# Patient Record
Sex: FEMALE | Race: WHITE | URBAN - METROPOLITAN AREA
[De-identification: names, ages, dates, MRNs, and addresses within clinical notes are randomized per-mention and may not be internally consistent; named-entity substitution may affect disease eponyms.]

---

## 2020-11-07 ENCOUNTER — INPATIENT (INPATIENT)
Facility: HOSPITAL | Age: 80
LOS: 3 days | Discharge: HOME | DRG: 287 | End: 2020-11-11
Attending: FAMILY MEDICINE | Admitting: FAMILY MEDICINE
Payer: MEDICARE

## 2020-11-07 VITALS — WEIGHT: 130.07 LBS

## 2020-11-07 DIAGNOSIS — R94.31 ABNORMAL ELECTROCARDIOGRAM [ECG] [EKG]: ICD-10-CM

## 2020-11-07 DIAGNOSIS — R07.9 CHEST PAIN, UNSPECIFIED: ICD-10-CM

## 2020-11-07 DIAGNOSIS — I21.3 ST ELEVATION (STEMI) MYOCARDIAL INFARCTION OF UNSPECIFIED SITE: ICD-10-CM

## 2020-11-07 LAB
ALBUMIN SERPL ELPH-MCNC: 3.9 G/DL — SIGNIFICANT CHANGE UP (ref 3.3–5)
ALP SERPL-CCNC: 136 U/L — HIGH (ref 40–120)
ALT FLD-CCNC: 60 U/L — SIGNIFICANT CHANGE UP (ref 12–78)
ANION GAP SERPL CALC-SCNC: 7 MMOL/L — SIGNIFICANT CHANGE UP (ref 5–17)
APPEARANCE UR: CLEAR — SIGNIFICANT CHANGE UP
APTT BLD: 31.3 SEC — SIGNIFICANT CHANGE UP (ref 27.5–35.5)
AST SERPL-CCNC: 60 U/L — HIGH (ref 15–37)
BASOPHILS # BLD AUTO: 0 K/UL — SIGNIFICANT CHANGE UP (ref 0–0.2)
BASOPHILS NFR BLD AUTO: 0 % — SIGNIFICANT CHANGE UP (ref 0–2)
BILIRUB SERPL-MCNC: 0.8 MG/DL — SIGNIFICANT CHANGE UP (ref 0.2–1.2)
BILIRUB UR-MCNC: ABNORMAL
BUN SERPL-MCNC: 21 MG/DL — SIGNIFICANT CHANGE UP (ref 7–23)
CALCIUM SERPL-MCNC: 8.9 MG/DL — SIGNIFICANT CHANGE UP (ref 8.5–10.1)
CHLORIDE SERPL-SCNC: 110 MMOL/L — HIGH (ref 96–108)
CK SERPL-CCNC: 52 U/L — SIGNIFICANT CHANGE UP (ref 26–192)
CO2 SERPL-SCNC: 23 MMOL/L — SIGNIFICANT CHANGE UP (ref 22–31)
COLOR SPEC: ABNORMAL
CREAT SERPL-MCNC: 0.9 MG/DL — SIGNIFICANT CHANGE UP (ref 0.5–1.3)
CRP SERPL-MCNC: 2.1 MG/DL — HIGH (ref 0–0.4)
D DIMER BLD IA.RAPID-MCNC: <150 NG/ML DDU — SIGNIFICANT CHANGE UP
DIFF PNL FLD: NEGATIVE — SIGNIFICANT CHANGE UP
EOSINOPHIL # BLD AUTO: 0 K/UL — SIGNIFICANT CHANGE UP (ref 0–0.5)
EOSINOPHIL NFR BLD AUTO: 0 % — SIGNIFICANT CHANGE UP (ref 0–6)
ERYTHROCYTE [SEDIMENTATION RATE] IN BLOOD: 28 MM/HR — HIGH (ref 0–20)
GLUCOSE SERPL-MCNC: 149 MG/DL — HIGH (ref 70–99)
GLUCOSE UR QL: NEGATIVE MG/DL — SIGNIFICANT CHANGE UP
HCT VFR BLD CALC: 38.3 % — SIGNIFICANT CHANGE UP (ref 34.5–45)
HGB BLD-MCNC: 13 G/DL — SIGNIFICANT CHANGE UP (ref 11.5–15.5)
IMM GRANULOCYTES NFR BLD AUTO: 0.4 % — SIGNIFICANT CHANGE UP (ref 0–1.5)
INR BLD: 1.04 RATIO — SIGNIFICANT CHANGE UP (ref 0.88–1.16)
KETONES UR-MCNC: ABNORMAL
LEUKOCYTE ESTERASE UR-ACNC: ABNORMAL
LIDOCAIN IGE QN: 119 U/L — SIGNIFICANT CHANGE UP (ref 73–393)
LYMPHOCYTES # BLD AUTO: 0.58 K/UL — LOW (ref 1–3.3)
LYMPHOCYTES # BLD AUTO: 8.6 % — LOW (ref 13–44)
MAGNESIUM SERPL-MCNC: 2 MG/DL — SIGNIFICANT CHANGE UP (ref 1.6–2.6)
MCHC RBC-ENTMCNC: 33.7 PG — SIGNIFICANT CHANGE UP (ref 27–34)
MCHC RBC-ENTMCNC: 33.9 GM/DL — SIGNIFICANT CHANGE UP (ref 32–36)
MCV RBC AUTO: 99.2 FL — SIGNIFICANT CHANGE UP (ref 80–100)
MONOCYTES # BLD AUTO: 0.47 K/UL — SIGNIFICANT CHANGE UP (ref 0–0.9)
MONOCYTES NFR BLD AUTO: 7 % — SIGNIFICANT CHANGE UP (ref 2–14)
NEUTROPHILS # BLD AUTO: 5.65 K/UL — SIGNIFICANT CHANGE UP (ref 1.8–7.4)
NEUTROPHILS NFR BLD AUTO: 84 % — HIGH (ref 43–77)
NITRITE UR-MCNC: NEGATIVE — SIGNIFICANT CHANGE UP
NT-PROBNP SERPL-SCNC: 81 PG/ML — SIGNIFICANT CHANGE UP (ref 0–450)
PH UR: 5 — SIGNIFICANT CHANGE UP (ref 5–8)
PLATELET # BLD AUTO: 133 K/UL — LOW (ref 150–400)
POTASSIUM SERPL-MCNC: 3.9 MMOL/L — SIGNIFICANT CHANGE UP (ref 3.5–5.3)
POTASSIUM SERPL-SCNC: 3.9 MMOL/L — SIGNIFICANT CHANGE UP (ref 3.5–5.3)
PROT SERPL-MCNC: 7.7 GM/DL — SIGNIFICANT CHANGE UP (ref 6–8.3)
PROT UR-MCNC: 30 MG/DL
PROTHROM AB SERPL-ACNC: 12.2 SEC — SIGNIFICANT CHANGE UP (ref 10.6–13.6)
RBC # BLD: 3.86 M/UL — SIGNIFICANT CHANGE UP (ref 3.8–5.2)
RBC # FLD: 12.2 % — SIGNIFICANT CHANGE UP (ref 10.3–14.5)
SARS-COV-2 RNA SPEC QL NAA+PROBE: SIGNIFICANT CHANGE UP
SODIUM SERPL-SCNC: 140 MMOL/L — SIGNIFICANT CHANGE UP (ref 135–145)
SP GR SPEC: 1.02 — SIGNIFICANT CHANGE UP (ref 1.01–1.02)
T3 SERPL-MCNC: 102 NG/DL — SIGNIFICANT CHANGE UP (ref 80–200)
T4 AB SER-ACNC: 5.9 UG/DL — SIGNIFICANT CHANGE UP (ref 4.6–12)
TROPONIN I SERPL-MCNC: <0.015 NG/ML — SIGNIFICANT CHANGE UP (ref 0.01–0.04)
TROPONIN I SERPL-MCNC: <0.015 NG/ML — SIGNIFICANT CHANGE UP (ref 0.01–0.04)
TSH SERPL-MCNC: 1.53 UU/ML — SIGNIFICANT CHANGE UP (ref 0.34–4.82)
TSH SERPL-MCNC: 3.52 UU/ML — SIGNIFICANT CHANGE UP (ref 0.34–4.82)
UROBILINOGEN FLD QL: 8 MG/DL
WBC # BLD: 6.73 K/UL — SIGNIFICANT CHANGE UP (ref 3.8–10.5)
WBC # FLD AUTO: 6.73 K/UL — SIGNIFICANT CHANGE UP (ref 3.8–10.5)

## 2020-11-07 PROCEDURE — 93306 TTE W/DOPPLER COMPLETE: CPT

## 2020-11-07 PROCEDURE — 85652 RBC SED RATE AUTOMATED: CPT

## 2020-11-07 PROCEDURE — 84480 ASSAY TRIIODOTHYRONINE (T3): CPT

## 2020-11-07 PROCEDURE — 84436 ASSAY OF TOTAL THYROXINE: CPT

## 2020-11-07 PROCEDURE — 84484 ASSAY OF TROPONIN QUANT: CPT

## 2020-11-07 PROCEDURE — 71045 X-RAY EXAM CHEST 1 VIEW: CPT | Mod: 26

## 2020-11-07 PROCEDURE — 99223 1ST HOSP IP/OBS HIGH 75: CPT

## 2020-11-07 PROCEDURE — 86769 SARS-COV-2 COVID-19 ANTIBODY: CPT

## 2020-11-07 PROCEDURE — 93308 TTE F-UP OR LMTD: CPT

## 2020-11-07 PROCEDURE — 84443 ASSAY THYROID STIM HORMONE: CPT

## 2020-11-07 PROCEDURE — 99223 1ST HOSP IP/OBS HIGH 75: CPT | Mod: 25

## 2020-11-07 PROCEDURE — 93458 L HRT ARTERY/VENTRICLE ANGIO: CPT

## 2020-11-07 PROCEDURE — 86140 C-REACTIVE PROTEIN: CPT

## 2020-11-07 PROCEDURE — 81001 URINALYSIS AUTO W/SCOPE: CPT

## 2020-11-07 PROCEDURE — 86431 RHEUMATOID FACTOR QUANT: CPT

## 2020-11-07 PROCEDURE — 93010 ELECTROCARDIOGRAM REPORT: CPT

## 2020-11-07 PROCEDURE — 83615 LACTATE (LD) (LDH) ENZYME: CPT

## 2020-11-07 PROCEDURE — 86039 ANTINUCLEAR ANTIBODIES (ANA): CPT

## 2020-11-07 PROCEDURE — 93458 L HRT ARTERY/VENTRICLE ANGIO: CPT | Mod: 26

## 2020-11-07 PROCEDURE — 36415 COLL VENOUS BLD VENIPUNCTURE: CPT

## 2020-11-07 PROCEDURE — 82550 ASSAY OF CK (CPK): CPT

## 2020-11-07 PROCEDURE — 87086 URINE CULTURE/COLONY COUNT: CPT

## 2020-11-07 PROCEDURE — 93306 TTE W/DOPPLER COMPLETE: CPT | Mod: 26

## 2020-11-07 RX ORDER — COLCHICINE 0.6 MG
0.6 TABLET ORAL ONCE
Refills: 0 | Status: COMPLETED | OUTPATIENT
Start: 2020-11-07 | End: 2020-11-07

## 2020-11-07 RX ORDER — AMLODIPINE BESYLATE 2.5 MG/1
5 TABLET ORAL DAILY
Refills: 0 | Status: DISCONTINUED | OUTPATIENT
Start: 2020-11-07 | End: 2020-11-07

## 2020-11-07 RX ORDER — IBUPROFEN 200 MG
400 TABLET ORAL EVERY 8 HOURS
Refills: 0 | Status: DISCONTINUED | OUTPATIENT
Start: 2020-11-07 | End: 2020-11-10

## 2020-11-07 RX ORDER — SODIUM CHLORIDE 9 MG/ML
1000 INJECTION, SOLUTION INTRAVENOUS
Refills: 0 | Status: DISCONTINUED | OUTPATIENT
Start: 2020-11-07 | End: 2020-11-08

## 2020-11-07 RX ORDER — SODIUM CHLORIDE 9 MG/ML
1000 INJECTION INTRAMUSCULAR; INTRAVENOUS; SUBCUTANEOUS ONCE
Refills: 0 | Status: COMPLETED | OUTPATIENT
Start: 2020-11-07 | End: 2020-11-07

## 2020-11-07 RX ORDER — SODIUM CHLORIDE 9 MG/ML
1000 INJECTION INTRAMUSCULAR; INTRAVENOUS; SUBCUTANEOUS
Refills: 0 | Status: DISCONTINUED | OUTPATIENT
Start: 2020-11-07 | End: 2020-11-07

## 2020-11-07 RX ORDER — ASPIRIN/CALCIUM CARB/MAGNESIUM 324 MG
325 TABLET ORAL ONCE
Refills: 0 | Status: COMPLETED | OUTPATIENT
Start: 2020-11-07 | End: 2020-11-07

## 2020-11-07 RX ORDER — FAMOTIDINE 10 MG/ML
20 INJECTION INTRAVENOUS
Refills: 0 | Status: DISCONTINUED | OUTPATIENT
Start: 2020-11-07 | End: 2020-11-11

## 2020-11-07 RX ORDER — SODIUM CHLORIDE 9 MG/ML
500 INJECTION INTRAMUSCULAR; INTRAVENOUS; SUBCUTANEOUS ONCE
Refills: 0 | Status: COMPLETED | OUTPATIENT
Start: 2020-11-07 | End: 2020-11-07

## 2020-11-07 RX ORDER — SODIUM CHLORIDE 9 MG/ML
1000 INJECTION, SOLUTION INTRAVENOUS
Refills: 0 | Status: DISCONTINUED | OUTPATIENT
Start: 2020-11-07 | End: 2020-11-07

## 2020-11-07 RX ORDER — ASPIRIN/CALCIUM CARB/MAGNESIUM 324 MG
81 TABLET ORAL DAILY
Refills: 0 | Status: DISCONTINUED | OUTPATIENT
Start: 2020-11-07 | End: 2020-11-11

## 2020-11-07 RX ORDER — COLCHICINE 0.6 MG
0.6 TABLET ORAL
Refills: 0 | Status: DISCONTINUED | OUTPATIENT
Start: 2020-11-07 | End: 2020-11-11

## 2020-11-07 RX ORDER — ONDANSETRON 8 MG/1
4 TABLET, FILM COATED ORAL EVERY 6 HOURS
Refills: 0 | Status: DISCONTINUED | OUTPATIENT
Start: 2020-11-07 | End: 2020-11-11

## 2020-11-07 RX ORDER — AMLODIPINE BESYLATE 2.5 MG/1
5 TABLET ORAL ONCE
Refills: 0 | Status: COMPLETED | OUTPATIENT
Start: 2020-11-07 | End: 2020-11-07

## 2020-11-07 RX ORDER — METOPROLOL TARTRATE 50 MG
25 TABLET ORAL DAILY
Refills: 0 | Status: DISCONTINUED | OUTPATIENT
Start: 2020-11-07 | End: 2020-11-07

## 2020-11-07 RX ORDER — ENOXAPARIN SODIUM 100 MG/ML
40 INJECTION SUBCUTANEOUS DAILY
Refills: 0 | Status: DISCONTINUED | OUTPATIENT
Start: 2020-11-07 | End: 2020-11-07

## 2020-11-07 RX ORDER — AMLODIPINE BESYLATE 2.5 MG/1
1 TABLET ORAL
Qty: 0 | Refills: 0 | DISCHARGE

## 2020-11-07 RX ADMIN — Medication 400 MILLIGRAM(S): at 12:23

## 2020-11-07 RX ADMIN — SODIUM CHLORIDE 100 MILLILITER(S): 9 INJECTION, SOLUTION INTRAVENOUS at 23:37

## 2020-11-07 RX ADMIN — SODIUM CHLORIDE 50 MILLILITER(S): 9 INJECTION, SOLUTION INTRAVENOUS at 12:18

## 2020-11-07 RX ADMIN — ONDANSETRON 4 MILLIGRAM(S): 8 TABLET, FILM COATED ORAL at 14:38

## 2020-11-07 RX ADMIN — SODIUM CHLORIDE 50 MILLILITER(S): 9 INJECTION, SOLUTION INTRAVENOUS at 14:49

## 2020-11-07 RX ADMIN — SODIUM CHLORIDE 500 MILLILITER(S): 9 INJECTION INTRAMUSCULAR; INTRAVENOUS; SUBCUTANEOUS at 14:59

## 2020-11-07 RX ADMIN — Medication 400 MILLIGRAM(S): at 12:19

## 2020-11-07 RX ADMIN — Medication 400 MILLIGRAM(S): at 22:00

## 2020-11-07 RX ADMIN — Medication 0.6 MILLIGRAM(S): at 12:23

## 2020-11-07 RX ADMIN — AMLODIPINE BESYLATE 5 MILLIGRAM(S): 2.5 TABLET ORAL at 14:08

## 2020-11-07 RX ADMIN — Medication 325 MILLIGRAM(S): at 09:13

## 2020-11-07 RX ADMIN — Medication 0.6 MILLIGRAM(S): at 22:00

## 2020-11-07 RX ADMIN — SODIUM CHLORIDE 1000 MILLILITER(S): 9 INJECTION INTRAMUSCULAR; INTRAVENOUS; SUBCUTANEOUS at 09:13

## 2020-11-07 RX ADMIN — FAMOTIDINE 20 MILLIGRAM(S): 10 INJECTION INTRAVENOUS at 23:56

## 2020-11-07 RX ADMIN — SODIUM CHLORIDE 100 MILLILITER(S): 9 INJECTION, SOLUTION INTRAVENOUS at 15:03

## 2020-11-07 RX ADMIN — Medication 25 MILLIGRAM(S): at 12:12

## 2020-11-07 RX ADMIN — SODIUM CHLORIDE 1000 MILLILITER(S): 9 INJECTION INTRAMUSCULAR; INTRAVENOUS; SUBCUTANEOUS at 14:54

## 2020-11-07 NOTE — CONSULT NOTE ADULT - PROBLEM SELECTOR RECOMMENDATION 9
Chest pain with ischemia and pericardial characteristics.  Further evaluation for possible STEMI with Emergent cardiac catheterization.  Risks, benefits, and alternatives of cardiac catheterization with percutaneous coronary intervention discussed with the patient/family including but not limited to death, myocardial infarction, stroke, bleeding, infection, or vascular injury. Patient expressed understanding of these risks and signed informed consent for procedure.

## 2020-11-07 NOTE — ED PROVIDER NOTE - PROGRESS NOTE DETAILS
Carlo HERNANDEZ: I spoke with Dr. Quintana regarding concerning EKG- confirms inferior wall MI; code stemi activated at 9:08am; full strength aspirin given; labs pending at this time; endorsed to Dr. Padilla for admission.

## 2020-11-07 NOTE — ED ADULT TRIAGE NOTE - CHIEF COMPLAINT QUOTE
Patient complains of "tightness" in neck and chest intermittently over past 2-3 days.  EKG done on arrival.

## 2020-11-07 NOTE — CHART NOTE - NSCHARTNOTEFT_GEN_A_CORE
Pt. became nauseated and blood pressure was measured by automated cuff in 70's and 80's systolic.  HR in 80's now.  By Manueal cuff I got 92/60 without pulses paradoxus.  Echo did show mild to moderate pericardial effusion c/w pericarditis. Preliminary review echo Pt. became nauseated and blood pressure was measured by automated cuff in 70's and 80's systolic.  HR in 80's now.  By Manueal cuff I got 92/60 without pulses paradoxus.  Echo did show mild to moderate pericardial effusion c/w pericarditis. Preliminary review echo shows mild to moderate pericardial effusion, Only 20 percent change in mitral inflow with respiration, no RA or RV collapse.  Will treat for now with medical therapy (NSAIDS/Cochicine/IVF) and ask thoracic surgery to consult as well.      D/w Dr. Padilla and staff and pt.

## 2020-11-07 NOTE — CONSULT NOTE ADULT - PROBLEM SELECTOR RECOMMENDATION 2
ECG with ST elevations and FL segment depressions.  CC for further evaluation and assessment to discern between STEMI and Pericarditis.

## 2020-11-07 NOTE — H&P ADULT - ASSESSMENT
chest pain, ecg- st elevation.  troponinx1-negative.  emergent cath-normal vessels, therefore no intervention done.  will gien symptoms and ecg changes consistent with pericarditis. prelim echo report-mild/moderate pericardial effusion.  covid-neg.  afebrile  -admit to [facility]   -cont ibuprofen, colchicine  -check esr, crp, ldh  -f/u bcx  -ecg in am   -cardio consult appreciated   -f/u echo official report  -cont aspirin, bb for now   -repeat echo if chest pain worsening     htn, controlled   -cont ccb and bb     dvt prophylaxis   -lovenox sc

## 2020-11-07 NOTE — H&P ADULT - HISTORY OF PRESENT ILLNESS
81 y/o female with h/o HTN presents to the ED c/o chest pain. Pt states that shes been experiencing burning/chest tightness across her chest with radiation to upper neck and upper back since yesterday into today. worse with taking a deep breath. associated with sob, nausea. no vomiting, diarrhea/consitpation. no radiation to left arm. normal po intake. no prior episodes in the past.  never sen cardiologist in the past.      in ED, noted to have st elevation in III, AVF.  cardio notified and concern for STEMI, therefore taken for emergent cardiac cath.  vessels appear patent and no intervention needed.  started on nsaids for possible pericarditis given symptoms and ecg changes.    pmh- htn   psh-denies any   soh-non-smoker,   fhmx- no cad in first degree relative

## 2020-11-07 NOTE — ED PROVIDER NOTE - OBJECTIVE STATEMENT
81 y/o female with pmhx of presents to the ED c/o chest pain. Pt states that shes been experiencing chest and neck pressure and "tightness" since yesterday associated with SOB. Pt states that she was experiencing burning across her chest, radiating p to her neck and throat. +nausea -vomiting. No weakness numbness or tingling in arms or legs. No other complaints at this time. 81 y/o female with pmhx of presents to the ED c/o chest pain. Pt states that shes been experiencing burning/chest tightness across her chest with radiation to upper neck and upper back since yesterday; associated sob; +nausea -vomiting. hx of HTN on amlodipine 5mg daily; recommended to f/u with cardio in past but has not done so; No weakness numbness or tingling in arms or legs. No other complaints at this time.

## 2020-11-07 NOTE — ED ADULT NURSE NOTE - NSIMPLEMENTINTERV_GEN_ALL_ED
Implemented All Fall with Harm Risk Interventions:  [place] to call system. Call bell, personal items and telephone within reach. Instruct patient to call for assistance. Room bathroom lighting operational. Non-slip footwear when patient is off stretcher. Physically safe environment: no spills, clutter or unnecessary equipment. Stretcher in lowest position, wheels locked, appropriate side rails in place. Provide visual cue, wrist band, yellow gown, etc. Monitor gait and stability. Monitor for mental status changes and reorient to person, place, and time. Review medications for side effects contributing to fall risk. Reinforce activity limits and safety measures with patient and family. Provide visual clues: red socks.

## 2020-11-07 NOTE — CONSULT NOTE ADULT - SUBJECTIVE AND OBJECTIVE BOX
HPI: 80 year old female with PMHX : HTN.  Presents with chest pain.  She describes it as burning/chest tightness across her chest with radiation to upper neck and upper back since yesterday waxing and waining.  Some improvement in pain with sitting more upright; + associated sob; +nausea -vomiting -diaphoresis.  Pain started yesterday and she slept through night and then when she awaoke it was still present this AM and came into ED.  No prior chest pain or cardiac history.      PAST MEDICAL & SURGICAL HISTORY:  HTN  No significant past surgical history    SOCIAL HISTORY: Non-Smoker/Social ETOH/ No Ilicit Drug use.    FAMILY HISTORY:  No CAD history in her family.    Allergies  No Known Allergies    Home Medications:  Amlodipine    REVIEW OF SYSTEMS: 13 systems were reviewed and all negative except for comments above.    Vital Signs Last 24 Hrs  T(C): 37.1 (07 Nov 2020 08:30), Max: 37.1 (07 Nov 2020 08:30)  T(F): 98.8 (07 Nov 2020 08:30), Max: 98.8 (07 Nov 2020 08:30)  HR: 104 (07 Nov 2020 10:41) (104 - 114)  BP: 120/79 (07 Nov 2020 10:35) (120/79 - 146/67)  BP(mean): 80 (07 Nov 2020 10:35) (80 - 93)  RR: 18 (07 Nov 2020 10:41) (17 - 24)  SpO2: 100% (07 Nov 2020 10:41) (98% - 100%)Daily     Daily I&O's Summary      PHYSICAL EXAM:  Constitutional: Mild discomfort from pain. awake and alert, well-developed  HEENT: PERRLA, EOMI,  No oral cyanosis. Oropharynx Clean and Dry.  Neck:  supple,  No JVD, No Thyroid enlargement. No Carotid Bruits bilaterally.  Respiratory: Breath sounds are clear bilaterally, No wheezing, rales or rhonchi  Cardiovascular: NL S1 and S2, RRR, no Murmurs, gallops or rubs  Gastrointestinal: Bowel Sounds present, soft   Extremities: No peripheral edema. No clubbing or cyanosis.  Barbeau Test performed in RUE and Negative.  Vascular: 2+ peripheral pulses in LE   Neurological: A/O x 3, no gross focal motor deficits  Musculoskeletal: no calf tenderness  Skin: No rashes.      LABS: All Labs Reviewed:                        13.0   6.73  )-----------( 133      ( 07 Nov 2020 09:03 )             38.3     07 Nov 2020 09:03    140    |  110    |  21     ----------------------------<  149    3.9     |  23     |  0.90     Ca    8.9        07 Nov 2020 09:03  Mg     2.0       07 Nov 2020 09:03    TPro  7.7    /  Alb  3.9    /  TBili  0.8    /  DBili  x      /  AST  60     /  ALT  60     /  AlkPhos  136    07 Nov 2020 09:03    PT/INR - ( 07 Nov 2020 09:03 )   PT: 12.2 sec;   INR: 1.04 ratio         PTT - ( 07 Nov 2020 09:03 )  PTT:31.3 sec  CARDIAC MARKERS ( 07 Nov 2020 09:03 )  <0.015 ng/mL / x     / x     / x     / x          11-07 @ 09:03  Pro Bnp 81    11-07 @ 09:03  TSH: 3.52    RADIOLOGY:  NIALL, NL size heart    EKG:  NSR, inferolateral ST elevations. IL seg. depression inferolateral and IL seg. elevation in AVR.

## 2020-11-08 DIAGNOSIS — I31.3 PERICARDIAL EFFUSION (NONINFLAMMATORY): ICD-10-CM

## 2020-11-08 LAB
CK SERPL-CCNC: 35 U/L — SIGNIFICANT CHANGE UP (ref 26–192)
CK SERPL-CCNC: 42 U/L — SIGNIFICANT CHANGE UP (ref 26–192)
RHEUMATOID FACT SERPL-ACNC: 16 IU/ML — HIGH (ref 0–13)
SARS-COV-2 IGG SERPL QL IA: NEGATIVE — SIGNIFICANT CHANGE UP
SARS-COV-2 IGM SERPL IA-ACNC: 0.09 INDEX — SIGNIFICANT CHANGE UP
TROPONIN I SERPL-MCNC: <0.015 NG/ML — SIGNIFICANT CHANGE UP (ref 0.01–0.04)
TROPONIN I SERPL-MCNC: <0.015 NG/ML — SIGNIFICANT CHANGE UP (ref 0.01–0.04)

## 2020-11-08 PROCEDURE — 99233 SBSQ HOSP IP/OBS HIGH 50: CPT

## 2020-11-08 RX ADMIN — Medication 0.6 MILLIGRAM(S): at 11:05

## 2020-11-08 RX ADMIN — FAMOTIDINE 20 MILLIGRAM(S): 10 INJECTION INTRAVENOUS at 21:41

## 2020-11-08 RX ADMIN — Medication 400 MILLIGRAM(S): at 06:15

## 2020-11-08 RX ADMIN — FAMOTIDINE 20 MILLIGRAM(S): 10 INJECTION INTRAVENOUS at 11:06

## 2020-11-08 RX ADMIN — Medication 81 MILLIGRAM(S): at 11:05

## 2020-11-08 RX ADMIN — Medication 0.6 MILLIGRAM(S): at 21:41

## 2020-11-08 RX ADMIN — Medication 400 MILLIGRAM(S): at 17:02

## 2020-11-08 RX ADMIN — Medication 400 MILLIGRAM(S): at 15:53

## 2020-11-08 RX ADMIN — Medication 400 MILLIGRAM(S): at 21:41

## 2020-11-08 NOTE — PROGRESS NOTE ADULT - SUBJECTIVE AND OBJECTIVE BOX
PCP:    REQUESTING PHYSICIAN:    REASON FOR CONSULT:    CHIEF COMPLAINT:    HPI:  HPI: 80 year old female with PMHX : HTN.  Presents with chest pain.  She describes it as burning/chest tightness across her chest with radiation to upper neck and upper back since yesterday waxing and waining.  Some improvement in pain with sitting more upright; + associated sob; +nausea -vomiting -diaphoresis.  Pain started yesterday and she slept through night and then when she awaoke it was still present this AM and came into ED.  No prior chest pain or cardiac history.    11/8/20 Pt feels improved. Cath results reviewed, Echo reviewed.     PAST MEDICAL & SURGICAL HISTORY:      SOCIAL HISTORY:    FAMILY HISTORY:      ALLERGIES:  Allergies    No Known Allergies    Intolerances        MEDICATIONS:    MEDICATIONS  (STANDING):  aspirin  chewable 81 milliGRAM(s) Oral daily  colchicine 0.6 milliGRAM(s) Oral two times a day  famotidine    Tablet 20 milliGRAM(s) Oral two times a day  ibuprofen  Tablet. 400 milliGRAM(s) Oral every 8 hours  sodium chloride 0.45%. 1000 milliLiter(s) (100 mL/Hr) IV Continuous <Continuous>    MEDICATIONS  (PRN):  ondansetron Injectable 4 milliGRAM(s) IV Push every 6 hours PRN Nausea        Vital Signs Last 24 Hrs  T(C): 36.4 (08 Nov 2020 10:09), Max: 36.4 (08 Nov 2020 10:09)  T(F): 97.6 (08 Nov 2020 10:09), Max: 97.6 (08 Nov 2020 10:09)  HR: 71 (08 Nov 2020 10:09) (66 - 104)  BP: 109/73 (08 Nov 2020 10:09) (64/47 - 124/64)  BP(mean): 81 (08 Nov 2020 10:09) (51 - 85)  RR: 21 (08 Nov 2020 10:09) (10 - 29)  SpO2: 91% (08 Nov 2020 09:16) (91% - 100%)Daily     Daily I&O's Summary    07 Nov 2020 07:01  -  08 Nov 2020 07:00  --------------------------------------------------------  IN: 1200 mL / OUT: 450 mL / NET: 750 mL        PHYSICAL EXAM:    Constitutional: NAD, awake and alert, well-developed  HEENT: PERR, EOMI,  No oral cyananosis.  Neck:  supple,  No JVD  Respiratory: Breath sounds are clear bilaterally, No wheezing, rales or rhonchi  Cardiovascular: S1 and S2, regular rate and rhythm, no Murmurs, gallops or rubs  Gastrointestinal: Bowel Sounds present, soft, nontender.   Extremities: No peripheral edema. No clubbing or cyanosis.  Vascular: 2+ peripheral pulses  Neurological: A/O x 3, no focal deficits  Musculoskeletal: no calf tenderness.  Skin: No rashes.      LABS: All Labs Reviewed:                        13.0   6.73  )-----------( 133      ( 07 Nov 2020 09:03 )             38.3     07 Nov 2020 09:03    140    |  110    |  21     ----------------------------<  149    3.9     |  23     |  0.90     Ca    8.9        07 Nov 2020 09:03  Mg     2.0       07 Nov 2020 09:03    TPro  7.7    /  Alb  3.9    /  TBili  0.8    /  DBili  x      /  AST  60     /  ALT  60     /  AlkPhos  136    07 Nov 2020 09:03    PT/INR - ( 07 Nov 2020 09:03 )   PT: 12.2 sec;   INR: 1.04 ratio         PTT - ( 07 Nov 2020 09:03 )  PTT:31.3 sec  CARDIAC MARKERS ( 08 Nov 2020 06:03 )  <0.015 ng/mL / x     / 35 U/L / x     / x      CARDIAC MARKERS ( 08 Nov 2020 00:32 )  <0.015 ng/mL / x     / 42 U/L / x     / x      CARDIAC MARKERS ( 07 Nov 2020 16:50 )  <0.015 ng/mL / x     / 52 U/L / x     / x      CARDIAC MARKERS ( 07 Nov 2020 09:03 )  <0.015 ng/mL / x     / x     / x     / x          Blood Culture:   11-07 @ 09:03  Pro Bnp 81    11-07 @ 11:57  TSH: 1.53  11-07 @ 09:03  TSH: 3.52      RADIOLOGY/EKG:  < from: 12 Lead ECG (11.07.20 @ 08:57) >  Diagnosis Line Sinus tachycardia  ST elevation, consider early repolarization, pericarditis, or injury ( inferior and lateral)  Confirmed by LILY COOK MD ([id number]) on 11/8/2020 8:33:33 AM    < end of copied text >      ECHO/CARDIAC CATHTERIZATION/STRESS TEST:  < from: TTE Echo Complete w/o Contrast w/ Doppler (11.07.20 @ 11:59) >  Impression     Summary     The left ventricle is normal in size, wall thickness, and wall motion.   Estimated left ventricular ejection fraction is >70 %.   Normal appearing left atrium.   Normal appearing right atrium.   Normal appearing right ventricle structure and function.   The aortic valve is not well visualized, appears sclerotic.   The mitral valve leaflets appear thin and normal.   Mitral inflow variation is within normal limits.   EA reversal of the mitral inflow consistent with reduced compliance of the   left ventricle.   Mild (1+) tricuspid valve regurgitation is present.   A moderate-sized pericardial effusion is present.   The IVC is at the upper limits of normal.     Signature     ----------------------------------------------------------------   Electronically signed by Stew Peñaloza MD(Interpreting   physician) on 11/07/2020 10:49 PM   ----------------------------------------------------------------    < end of copied text >

## 2020-11-08 NOTE — PROGRESS NOTE ADULT - SUBJECTIVE AND OBJECTIVE BOX
81 y/o female with h/o HTN presents to the [facility] c/o chest pain. Pt states that shes been experiencing burning/chest tightness across her chest with radiation to upper neck and upper back since yesterday into today, worse with taking a deep breath  associated with sob, nausea.     in ED, noted to have st elevation in III, AVF.  cardio notified and concern for STEMI, therefore taken for emergent cardiac cath.  vessels appear patent and no intervention needed.  started on nsaids for possible pericarditis given symptoms and ecg changes.  No events over night    Vital Signs Last 24 Hrs  T(C): 36.4 (08 Nov 2020 10:09), Max: 36.4 (08 Nov 2020 10:09)  T(F): 97.6 (08 Nov 2020 10:09), Max: 97.6 (08 Nov 2020 10:09)  HR: 71 (08 Nov 2020 10:09) (66 - 93)  BP: 109/73 (08 Nov 2020 10:09) (64/47 - 124/64)  BP(mean): 81 (08 Nov 2020 10:09) (51 - 85)  RR: 21 (08 Nov 2020 10:09) (10 - 29)  SpO2: 91% (08 Nov 2020 09:16) (91% - 100%)    heent nc at [facility]   chest  cta  cvs s1s2 reg no m/g/r  abd soft nt nd +bs  extr no e/c/c  neuro non focal                          13.0   6.73  )-----------( 133      ( 07 Nov 2020 09:03 )             38.3   11-07    140  |  110<H>  |  21  ----------------------------<  149<H>  3.9   |  23  |  0.90    Ca    8.9      07 Nov 2020 09:03  Mg     2.0     11-07    TPro  7.7  /  Alb  3.9  /  TBili  0.8  /  DBili  x   /  AST  60<H>  /  ALT  60  /  AlkPhos  136<H>  11-07      * CP sec to acute pericarditis  nsaids  colchicine  thoracic consult pending  seems to be improving, no evidence of tamponade

## 2020-11-08 NOTE — PROGRESS NOTE ADULT - PROBLEM SELECTOR PLAN 1
Probable pericarditis. Responding to treatment. Should continue colchicine and NSAIDs as an opt. Cath revealed non occlusive disease.

## 2020-11-09 LAB
CULTURE RESULTS: SIGNIFICANT CHANGE UP
SPECIMEN SOURCE: SIGNIFICANT CHANGE UP

## 2020-11-09 PROCEDURE — 99233 SBSQ HOSP IP/OBS HIGH 50: CPT

## 2020-11-09 PROCEDURE — 93308 TTE F-UP OR LMTD: CPT | Mod: 26

## 2020-11-09 PROCEDURE — 99222 1ST HOSP IP/OBS MODERATE 55: CPT

## 2020-11-09 RX ADMIN — Medication 400 MILLIGRAM(S): at 14:18

## 2020-11-09 RX ADMIN — Medication 0.6 MILLIGRAM(S): at 10:39

## 2020-11-09 RX ADMIN — FAMOTIDINE 20 MILLIGRAM(S): 10 INJECTION INTRAVENOUS at 10:39

## 2020-11-09 RX ADMIN — Medication 0.6 MILLIGRAM(S): at 21:32

## 2020-11-09 RX ADMIN — Medication 400 MILLIGRAM(S): at 08:04

## 2020-11-09 RX ADMIN — Medication 400 MILLIGRAM(S): at 21:32

## 2020-11-09 RX ADMIN — Medication 81 MILLIGRAM(S): at 10:39

## 2020-11-09 RX ADMIN — Medication 400 MILLIGRAM(S): at 08:05

## 2020-11-09 RX ADMIN — FAMOTIDINE 20 MILLIGRAM(S): 10 INJECTION INTRAVENOUS at 21:32

## 2020-11-09 NOTE — PROGRESS NOTE ADULT - SUBJECTIVE AND OBJECTIVE BOX
HPI:   80 year old female with PMHX : HTN.  Presents with chest pain.  She describes it as burning/chest tightness across her chest with radiation to upper neck and upper back since yesterday waxing and waining.  Some improvement in pain with sitting more upright; + associated sob; +nausea -vomiting -diaphoresis.  Pain started yesterday and she slept through night and then when she awaoke it was still present this AM and came into ED.  No prior chest pain or cardiac history.      20 Pt feels improved. Cath results reviewed, Echo reviewed.   2020: Picture completely c/w pericarditis and is much better with therapy. CP resolved and no SOB, BP stable.     Allergies  No Known Allergies    MEDICATIONS  (STANDING):  aspirin  chewable 81 milliGRAM(s) Oral daily  colchicine 0.6 milliGRAM(s) Oral two times a day  famotidine    Tablet 20 milliGRAM(s) Oral two times a day  ibuprofen  Tablet. 400 milliGRAM(s) Oral every 8 hours    MEDICATIONS  (PRN):  ondansetron Injectable 4 milliGRAM(s) IV Push every 6 hours PRN Nausea      Vital Signs Last 24 Hrs  T(C): 36.1 (2020 06:28), Max: 37 (2020 00:58)  T(F): 97 (2020 06:28), Max: 98.6 (2020 00:58)  HR: 82 (2020 08:00) (64 - 85)  BP: 138/62 (2020 08:00) (97/56 - 140/125)  BP(mean): 82 (2020 08:00) (56 - 129)  RR: 20 (2020 08:00) (10 - 24)  SpO2: 87% (2020 12:00) (87% - 100%)    I&O's Detail      Daily     Daily Weight in k.2 (2020 06:28)    PHYSICAL EXAM:    Constitutional: NAD, awake and alert, well-developed  HEENT: PERR, EOMI,  No oral cyananosis.  Neck:  supple,  No JVD  Respiratory: Breath sounds are clear bilaterally, No wheezing, rales or rhonchi  Cardiovascular: S1 and S2, regular rate and rhythm, no Murmurs, gallops or rubs  Gastrointestinal: Bowel Sounds present, soft, nontender.   Extremities: No peripheral edema. No clubbing or cyanosis.  Vascular: 2+ peripheral pulses  Neurological: A/O x 3, no focal deficits  Musculoskeletal: no calf tenderness.  Skin: No rashes.      LABS: All Labs Reviewed:                        13.0   6.73  )-----------( 133      ( 2020 09:03 )             38.3     11    140  |  110<H>  |  21  ----------------------------<  149<H>  3.9   |  23  |  0.90    Ca    8.9      2020 09:03  Mg     2.0         TPro  7.7  /  Alb  3.9  /  TBili  0.8  /  DBili  x   /  AST  60<H>  /  ALT  60  /  AlkPhos  136<H>      CARDIAC MARKERS ( 2020 06:03 )  <0.015 ng/mL / x     / 35 U/L / x     / x      CARDIAC MARKERS ( 2020 00:32 )  <0.015 ng/mL / x     / 42 U/L / x     / x      CARDIAC MARKERS ( 2020 16:50 )  <0.015 ng/mL / x     / 52 U/L / x     / x      CARDIAC MARKERS ( 2020 09:03 )  <0.015 ng/mL / x     / x     / x     / x          LIVER FUNCTIONS - ( 2020 09:03 )  Alb: 3.9 g/dL / Pro: 7.7 gm/dL / ALK PHOS: 136 U/L / ALT: 60 U/L / AST: 60 U/L / GGT: x           PT/INR - ( 2020 09:03 )   PT: 12.2 sec;   INR: 1.04 ratio         PTT - ( 2020 09:03 )  PTT:31.3 sec          RADIOLOGY/EKG:  < from: 12 Lead ECG (20 @ 08:57) >  Diagnosis Line Sinus tachycardia  ST elevation, consider early repolarization, pericarditis, or injury ( inferior and lateral)  Confirmed by LILY COOK MD ([id number]) on 2020 8:33:33 AM    < end of copied text >      ECHO/CARDIAC CATHTERIZATION/STRESS TEST:  < from: TTE Echo Complete w/o Contrast w/ Doppler (20 @ 11:59) >  Impression     Summary     The left ventricle is normal in size, wall thickness, and wall motion.   Estimated left ventricular ejection fraction is >70 %.   Normal appearing left atrium.   Normal appearing right atrium.   Normal appearing right ventricle structure and function.   The aortic valve is not well visualized, appears sclerotic.   The mitral valve leaflets appear thin and normal.   Mitral inflow variation is within normal limits.   EA reversal of the mitral inflow consistent with reduced compliance of the   left ventricle.   Mild (1+) tricuspid valve regurgitation is present.   A moderate-sized pericardial effusion is present.   The IVC is at the upper limits of normal.     Signature     ----------------------------------------------------------------   Electronically signed by Stew Peñaloza MD(Interpreting   physician) on 2020 10:49 PM   ----------------------------------------------------------------    < end of copied text >

## 2020-11-09 NOTE — PROGRESS NOTE ADULT - SUBJECTIVE AND OBJECTIVE BOX
81 y/o female with h/o HTN presents to the ED c/o chest pain. Pt states that shes been experiencing burning/chest tightness across her chest with radiation to upper neck and upper back since yesterday into today, worse with taking a deep breath  associated with sob, nausea.     INTERVAL HPI: no complaints, no dyspnea, no CP  Other ROS reviewed and neg     Vital Signs Last 24 Hrs  T(C): 36.1 (2020 06:28), Max: 37 (2020 00:58)  T(F): 97 (2020 06:28), Max: 98.6 (2020 00:58)  HR: 77 (2020 14:00) (68 - 85)  BP: 134/70 (2020 14:00) (97/56 - 140/125)  BP(mean): 87 (2020 14:00) (56 - 129)  RR: 23 (2020 14:00) (10 - 24)    CARDIAC MARKERS ( 2020 06:03 )  <0.015 ng/mL / x     / 35 U/L / x     / x      CARDIAC MARKERS ( 2020 00:32 )  <0.015 ng/mL / x     / 42 U/L / x     / x      CARDIAC MARKERS ( 2020 16:50 )  <0.015 ng/mL / x     / 52 U/L / x     / x        Urinalysis Basic - ( 2020 21:55 )    Color: Kim / Appearance: Clear / S.020 / pH: x  Gluc: x / Ketone: Trace  / Bili: Small / Urobili: 8 mg/dL   Blood: x / Protein: 30 mg/dL / Nitrite: Negative   Leuk Esterase: Trace / RBC: 0-2 /HPF / WBC 0-2   Sq Epi: x / Non Sq Epi: Occasional / Bacteria: Many    TTE Echo Complete w/o Contrast w/ Doppler (20 @ 11:59)  Summary   The left ventricle is normal in size, wall thickness, and wall motion.   Estimated left ventricular ejection fraction is >70 %.   Normal appearing left atrium.   Normal appearing right atrium.   Normal appearing right ventricle structure and function.   The aortic valve is not well visualized, appears sclerotic.   The mitral valve leaflets appear thin and normal.   Mitral inflow variation is within normal limits.   EA reversal of the mitral inflow consistent with reduced compliance of the   left ventricle.   Mild (1+) tricuspid valve regurgitation is present.   A moderate-sized pericardial effusion is present.   The IVC is at the upper limits of normal.    MEDICATIONS  (STANDING):  aspirin  chewable 81 milliGRAM(s) Oral daily  colchicine 0.6 milliGRAM(s) Oral two times a day  famotidine    Tablet 20 milliGRAM(s) Oral two times a day  ibuprofen  Tablet. 400 milliGRAM(s) Oral every 8 hours    MEDICATIONS  (PRN):  ondansetron Injectable 4 milliGRAM(s) IV Push every 6 hours PRN Nausea    RADIOLOGY: personally visualized    PHYSICAL EXAM:    General: female in no acute distress  Eyes: PERRLA, EOMI; conjunctiva and sclera clear  Head: Normocephalic; atraumatic  ENMT: No nasal discharge; airway clear  Neck: Supple; non tender; no masses  Respiratory: No wheezes, rales or rhonchi  Cardiovascular: Regular rate and rhythm. S1 and S2   Gastrointestinal: Soft non-tender non-distended; Normal bowel sounds  Genitourinary: No costovertebral angle tenderness  Extremities:  No clubbing, cyanosis or edema  Vascular: Peripheral pulses palpable 2+ bilaterally  Neurological: Alert and oriented x4  Skin: Warm and dry.   Musculoskeletal: Normal gait, tone, without deformities  Psychiatric: Cooperative and appropriate

## 2020-11-09 NOTE — CHART NOTE - NSCHARTNOTEFT_GEN_A_CORE
Preliminary Review of echo shows pericardial slightly more than saturday, but no tamponade physiology.  awaiting CT surgery Consult. Preliminary Review of echo shows pericardial slightly more than saturday, but no tamponade physiology.  awaiting CT surgery Consult. Will likely have to stay and observe and repeat echo gabriel./wed

## 2020-11-09 NOTE — CONSULT NOTE ADULT - SUBJECTIVE AND OBJECTIVE BOX
HPI:  79 y/o female with diagnosis of pericarditis with a moderate pericardial effusion. Thoracic surgery consulted for potential drainage of the fluid. Patient admitted over the weekend with upper chest discomfort. EKG showed ST elevation. She had a cardiac cath performed Saturday morning which showed obstructive pattern. A ECHO ws done which showed a moderate pericardial effusion. Patient was placed on aspirin and colchicine for the pericarditis. She is currently in the CICU. She denies any chest pain, shortness of breath, orthopnea, cough etc. Her vitals are stable.     Repeat ECHO performed today showed slight increase in the pericardial fluid when compared to the one done on Saturday. The final report is pending.           PMHx: hypertension    PSx: none    Social Hx: she is a never smoker; lives with ; her son is an orthopedic surgeon in [facility] Hx:  no cad in first degree relative       PAST MEDICAL & SURGICAL HISTORY:  none       REVIEW OF SYSTEMS      General:No Weight change/ Fatigue/ HA/Dizzy	    Skin/Breast: No Rashes/ Lesions/ Masses  	  Ophthalmologic: No Blurry vision/ Glaucoma/ Blindness  	  ENMT: No Hearing loss/ Drainage/ Lesions	    Respiratory and Thorax: upper chest discomfort which has now resolved  	  Cardiovascular: No Chest pain/ Palpitations/ Diaphoresis	    Gastrointestinal: No Nausea/ Vomiting/ Constipation/ Appetite Change	    Genitourinary: No Heamturia/ Dysuria/ Frequency change/ Impotence	    Musculoskeletal: No Pain/ Weakness/ Claudication	    Neurological: No Seizures/ TIA/CVA/ Parastesias	    Psychiatric: No Dementia/ Depression/ SI/HI	    Hematology/Lymphatics: No hx of bleeding/ Edema	    Endocrine:	No Hyperglycemia/ Hypoglycemia    Allergic/Immunologic:	 No Anaphylaxis/ Intolerance/ Recent illnesses    MEDICATIONS  (STANDING):  aspirin  chewable 81 milliGRAM(s) Oral daily  colchicine 0.6 milliGRAM(s) Oral two times a day  famotidine    Tablet 20 milliGRAM(s) Oral two times a day  ibuprofen  Tablet. 400 milliGRAM(s) Oral every 8 hours    MEDICATIONS  (PRN):  ondansetron Injectable 4 milliGRAM(s) IV Push every 6 hours PRN Nausea      Allergies    No Known Allergies    Intolerances        SOCIAL HISTORY:  Occupation: retired   Smoking Hx: denies  Etoh Hx: denies  IVDA Hx: denies    FAMILY HISTORY:    unless noted, no significant family hx with Mother, Father, Siblings    Vital Signs Last 24 Hrs  T(C): 36.1 (2020 06:28), Max: 37 (2020 00:58)  T(F): 97 (2020 06:28), Max: 98.6 (2020 00:58)  HR: 75 (2020 13:00) (68 - 85)  BP: 115/57 (2020 13:00) (97/56 - 140/125)  BP(mean): 68 (2020 13:00) (56 - 129)  RR: 21 (2020 13:00) (10 - 24)  SpO2: --    General: WN/WD NAD  Neurology: Awake, nonfocal, CRUZ x 4  Eyes: Scleras clear, PERRLA/ EOMI, Gross vision intact  ENT:Gross hearing intact, grossly patent pharynx, no stridor  Neck: Neck supple, trachea midline, No JVD,   Respiratory: CTA B/L, No wheezing, rales, rhonchi  CV: RRR, S1S2, no murmurs, rubs or gallops  Abdominal: Soft, NT, ND +BS,   Extremities: No edema, + peripheral pulses  Skin: No Rashes, Hematoma, Ecchymosis  Lymphatic: No Neck, axilla, groin LAD  Psych: Oriented x 3, normal affect                  Urinalysis Basic - ( 2020 21:55 )    Color: Kim / Appearance: Clear / S.020 / pH: x  Gluc: x / Ketone: Trace  / Bili: Small / Urobili: 8 mg/dL   Blood: x / Protein: 30 mg/dL / Nitrite: Negative   Leuk Esterase: Trace / RBC: 0-2 /HPF / WBC 0-2   Sq Epi: x / Non Sq Epi: Occasional / Bacteria: Many        RADIOLOGY & ADDITIONAL STUDIES:    ASSESSMENT:       79 yo female admitted with pericarditis and pericardial effusion without tamponade.     -Will follow up on repeat ECHO that was done today  - Agree with cardiology to repeat ECHO on Wednesday   - Hemodynamically stable and she denies any symptoms of pericardial effusion  - Okay to ambulate from my standpoint.     Indications for subxiphoid window are: persistent clinical symptoms, enlarging pericardial effusion or impending tamponade, failure of pericardiocentesis, and recurrent pericardial effusion.   I personally spoke to the Patients son and he inquired about pericardiocentesis versus subxiphoid window; indications for each of those procedures were reviewed with him.   Will continue to follow along with you     Thank You for the consult

## 2020-11-09 NOTE — PROGRESS NOTE ADULT - ASSESSMENT
79 y/o female with h/o HTN presents to the ED c/o chest pain    1. CP with neg cardiac cath - due to acute pericarditis   - cont NSAIDs, colchicine   - repeat TTE as per cardiology slightly more fluid - discussed with CTS - pericardiocentesis will be indicated if tamponade physiology present or if pt becomes symptomatic   - will observe pt and repeat TTE in next 24-48h and determine plan of action

## 2020-11-10 ENCOUNTER — TRANSCRIPTION ENCOUNTER (OUTPATIENT)
Age: 80
End: 2020-11-10

## 2020-11-10 PROCEDURE — 99233 SBSQ HOSP IP/OBS HIGH 50: CPT

## 2020-11-10 PROCEDURE — 99232 SBSQ HOSP IP/OBS MODERATE 35: CPT

## 2020-11-10 RX ORDER — IBUPROFEN 200 MG
1 TABLET ORAL
Qty: 0 | Refills: 0 | DISCHARGE
Start: 2020-11-10

## 2020-11-10 RX ORDER — INDOMETHACIN 50 MG
50 CAPSULE ORAL ONCE
Refills: 0 | Status: COMPLETED | OUTPATIENT
Start: 2020-11-10 | End: 2020-11-10

## 2020-11-10 RX ORDER — ASPIRIN/CALCIUM CARB/MAGNESIUM 324 MG
1 TABLET ORAL
Qty: 30 | Refills: 0
Start: 2020-11-10 | End: 2020-12-09

## 2020-11-10 RX ORDER — FAMOTIDINE 10 MG/ML
1 INJECTION INTRAVENOUS
Qty: 20 | Refills: 0
Start: 2020-11-10 | End: 2020-11-19

## 2020-11-10 RX ORDER — INDOMETHACIN 50 MG
25 CAPSULE ORAL EVERY 8 HOURS
Refills: 0 | Status: DISCONTINUED | OUTPATIENT
Start: 2020-11-10 | End: 2020-11-11

## 2020-11-10 RX ORDER — COLCHICINE 0.6 MG
1 TABLET ORAL
Qty: 20 | Refills: 0
Start: 2020-11-10 | End: 2020-11-19

## 2020-11-10 RX ADMIN — Medication 50 MILLIGRAM(S): at 16:51

## 2020-11-10 RX ADMIN — Medication 81 MILLIGRAM(S): at 10:00

## 2020-11-10 RX ADMIN — Medication 400 MILLIGRAM(S): at 06:12

## 2020-11-10 RX ADMIN — Medication 400 MILLIGRAM(S): at 14:45

## 2020-11-10 RX ADMIN — FAMOTIDINE 20 MILLIGRAM(S): 10 INJECTION INTRAVENOUS at 10:00

## 2020-11-10 RX ADMIN — Medication 0.6 MILLIGRAM(S): at 10:00

## 2020-11-10 RX ADMIN — Medication 400 MILLIGRAM(S): at 14:10

## 2020-11-10 NOTE — DISCHARGE NOTE PROVIDER - NSDCMRMEDTOKEN_GEN_ALL_CORE_FT
amLODIPine 5 mg oral tablet: 1 tab(s) orally once a day  aspirin 81 mg oral tablet, chewable: 1 tab(s) orally once a day  colchicine 0.6 mg oral tablet: 1 tab(s) orally 2 times a day  famotidine 20 mg oral tablet: 1 tab(s) orally 2 times a day  ibuprofen 400 mg oral tablet: 1 tab(s) orally every 8 hours   amLODIPine 5 mg oral tablet: 1 tab(s) orally once a day  aspirin 81 mg oral tablet, chewable: 1 tab(s) orally once a day  colchicine 0.6 mg oral tablet: 1 tab(s) orally 2 times a day  famotidine 20 mg oral tablet: 1 tab(s) orally 2 times a day  indomethacin 25 mg oral capsule: 1 cap(s) orally every 8 hours

## 2020-11-10 NOTE — DISCHARGE NOTE PROVIDER - CARE PROVIDERS DIRECT ADDRESSES
[email or web address]
not applicable

## 2020-11-10 NOTE — PROGRESS NOTE ADULT - PROBLEM SELECTOR PLAN 3
Moderate in size without obvious evidence of tamponade hemodynamically and echocardiogram to monitor size     will repeat echo  , continue NSAID  Colchicine

## 2020-11-10 NOTE — PROGRESS NOTE ADULT - SUBJECTIVE AND OBJECTIVE BOX
81 y/o female with h/o HTN presents to the [facility] c/o chest pain. Pt states that shes been experiencing burning/chest tightness across her chest with radiation to upper neck and upper back since yesterday into today, worse with taking a deep breath  associated with sob, nausea.     INTERVAL HPI: no complaints, no dyspnea, no CP, no nausea, admits to mild chest tightness  Other ROS reviewed and neg     Vital Signs Last 24 Hrs  T(C): 36.1 (10 Nov 2020 11:09), Max: 36.2 (09 Nov 2020 19:00)  T(F): 97 (10 Nov 2020 11:09), Max: 97.2 (09 Nov 2020 19:00)  HR: 66 (10 Nov 2020 11:00) (62 - 81)  BP: 135/68 (10 Nov 2020 11:00) (110/58 - 147/76)  BP(mean): 83 (10 Nov 2020 11:00) (67 - 90)  RR: 19 (10 Nov 2020 11:00) (9 - 26)  SpO2: 98% (10 Nov 2020 08:00) (97% - 100%)    MEDICATIONS  (STANDING):  aspirin  chewable 81 milliGRAM(s) Oral daily  colchicine 0.6 milliGRAM(s) Oral two times a day  famotidine    Tablet 20 milliGRAM(s) Oral two times a day  ibuprofen  Tablet. 400 milliGRAM(s) Oral every 8 hours    MEDICATIONS  (PRN):  ondansetron Injectable 4 milliGRAM(s) IV Push every 6 hours PRN Nausea    Transthoracic Echocardiogram Follow Up (11.09.20 @ 10:10)     Summary     Limited study to asses pericardial effusion.   Estimated left ventricular ejection fraction is >70 %.   Normal appearing right ventricle structure and function.   Mitral inflow variation is within normal limits.   A moderate pericardial effusion is present.   Pleural effusion - is present..   The IVC is at the upper limits of normal with decreased respiratory   variation.      PHYSICAL EXAM:    General: female in no acute distress  Eyes: PERRLA, EOMI; conjunctiva and sclera clear  Head: Normocephalic; atraumatic  ENMT: No nasal discharge; airway clear  Neck: Supple; non tender; no masses  Respiratory: No wheezes, rales or rhonchi  Cardiovascular: Regular rate and rhythm. S1 and S2   Gastrointestinal: Soft non-tender non-distended; Normal bowel sounds  Genitourinary: No costovertebral angle tenderness  Extremities:  No clubbing, cyanosis or edema  Vascular: Peripheral pulses palpable 2+ bilaterally  Neurological: Alert and oriented x4  Skin: Warm and dry.   Musculoskeletal: Normal gait, tone, without deformities  Psychiatric: Cooperative and appropriate

## 2020-11-10 NOTE — DISCHARGE NOTE PROVIDER - CARE PROVIDER_API CALL
Ezequiel Quintana  CARDIOVASCULAR DISEASE  [address] [place] [address]  Phone: [phone]  Fax: [phone]  Follow Up Time:     SHANELL LATHAM  Internal Medicine  [address] [place] [address]  Phone: [phone]  Fax: [phone]  Follow Up Time:

## 2020-11-10 NOTE — PROGRESS NOTE ADULT - SUBJECTIVE AND OBJECTIVE BOX
HPI:   80 year old female with PMHX : HTN.  Presents with chest pain.  She describes it as burning/chest tightness across her chest with radiation to upper neck and upper back since yesterday waxing and waining.  Some improvement in pain with sitting more upright; + associated sob; +nausea -vomiting -diaphoresis.  Pain started yesterday and she slept through night and then when she awaoke it was still present this AM and came into ED.  No prior chest pain or cardiac history.      11/8/20 Pt feels improved. Cath results reviewed, Echo reviewed.   11/9/2020: Picture completely c/w pericarditis and is much better with therapy. CP resolved and no SOB, BP stable.  11/10/20 Patient is feeling ok , stable B_P and heart rate , denies any shortness of breath      MEDICATIONS  (STANDING):  aspirin  chewable 81 milliGRAM(s) Oral daily  colchicine 0.6 milliGRAM(s) Oral two times a day  famotidine    Tablet 20 milliGRAM(s) Oral two times a day  ibuprofen  Tablet. 400 milliGRAM(s) Oral every 8 hours    MEDICATIONS  (PRN):  ondansetron Injectable 4 milliGRAM(s) IV Push every 6 hours PRN Nausea      Vital Signs Last 24 Hrs  T(C): 36.2 (09 Nov 2020 19:00), Max: 36.2 (09 Nov 2020 19:00)  T(F): 97.2 (09 Nov 2020 19:00), Max: 97.2 (09 Nov 2020 19:00)  HR: 81 (10 Nov 2020 08:00) (62 - 81)  BP: 147/76 (10 Nov 2020 08:00) (109/67 - 147/76)  BP(mean): 89 (10 Nov 2020 08:00) (67 - 90)  RR: 19 (10 Nov 2020 08:00) (9 - 26)  SpO2: 98% (10 Nov 2020 08:00) (97% - 100%)    I&O's Summary      PHYSICAL EXAM:    Constitutional: NAD, awake and alert, well-developed  HEENT: PERR, EOMI,  No oral cyananosis.  Neck:  supple,  No JVD  Respiratory: Breath sounds are clear bilaterally, No wheezing, rales or rhonchi  Cardiovascular: S1 and S2, regular rate and rhythm, no Murmurs, gallops or rubs  Gastrointestinal: Bowel Sounds present, soft, nontender.   Extremities: No peripheral edema. No clubbing or cyanosis.  Vascular: 2+ peripheral pulses  Neurological: A/O x 3, no focal deficits  Musculoskeletal: no calf tenderness.  Skin: No rashes.      LABS: All Labs Reviewed:           Culture Results:   <10,000 CFU/mL Normal Urogenital Yvonne (11-07-20 @ 21:55)          RADIOLOGY/EKG:  < from: 12 Lead ECG (11.07.20 @ 08:57) >  Diagnosis Line Sinus tachycardia  ST elevation, consider early repolarization, pericarditis, or injury ( inferior and lateral)  Confirmed by LILY COOK MD ([id number]) on 11/8/2020 8:33:33 AM    < end of copied text >      ECHO/CARDIAC CATHTERIZATION/STRESS TEST:  < from: TTE Echo Complete w/o Contrast w/ Doppler (11.07.20 @ 11:59) >  Impression     Summary     The left ventricle is normal in size, wall thickness, and wall motion.   Estimated left ventricular ejection fraction is >70 %.   Normal appearing left atrium.   Normal appearing right atrium.   Normal appearing right ventricle structure and function.   The aortic valve is not well visualized, appears sclerotic.   The mitral valve leaflets appear thin and normal.   Mitral inflow variation is within normal limits.   EA reversal of the mitral inflow consistent with reduced compliance of the   left ventricle.   Mild (1+) tricuspid valve regurgitation is present.   A moderate-sized pericardial effusion is present.   The IVC is at the upper limits of normal.     Signature     ----------------------------------------------------------------   Electronically signed by Stew Peñaloza MD(Interpreting   physician) on 11/07/2020 10:49 PM   ----------------------------------------------------------------    h< from: Transthoracic Echocardiogram Follow Up (11.09.20 @ 10:10) >  mary     Limited study to asses pericardial effusion.   Estimated left ventricular ejection fraction is >70 %.   Normal appearing right ventricle structure and function.   Mitral inflow variation is within normal limits.   A moderate pericardial effusion is present.   Pleural effusion - is present..   The IVC is at the upper limits of normal with decreased respiratory   variation.     Signature     ----------------------------------------------------------------   Electronically signed by Fabian Syed MD(Interpreting   physician) on 11/09/2020 05:27 PM   ----------------------------------------------------------------    < end of copied text >      < end of copied text >

## 2020-11-10 NOTE — PROGRESS NOTE ADULT - ASSESSMENT
79 y/o female with h/o HTN presents to the ED c/o chest pain    1. CP with neg cardiac cath - due to acute pericarditis   - cont NSAIDs, colchicine   - repeat TTE as per cardiology slightly more fluid - discussed with CTS - pericardiocentesis will be indicated if tamponade physiology present or if pt becomes symptomatic   - will observe pt and repeat TTE in next 24-48h and determine plan of action    DC planning

## 2020-11-10 NOTE — DISCHARGE NOTE PROVIDER - HOSPITAL COURSE
81 y/o female with h/o HTN presents to the ED c/o chest pain    1. CP with neg cardiac cath - due to acute pericarditis   - cont NSAIDs, colchicine   - repeat TTE as per cardiology slightly more fluid - discussed with CTS - pericardiocentesis will be indicated if tamponade physiology present or if pt becomes symptomatic   - will observe pt and repeat TTE in next 24-48h and determine plan of action    DC planning   81 y/o female with h/o HTN presents to the ED c/o chest pain   - neg cardiac cath - due to acute pericarditis   - cont NSAIDs, colchicine   - f/u repeat TTE as outpt with cardiology    Medically stable for DC

## 2020-11-10 NOTE — PROGRESS NOTE ADULT - SUBJECTIVE AND OBJECTIVE BOX
Subjective:  Patient states chest pain has improved.  She does not have shortness of breath or difficulty breathing.    Vital Signs:  Vital Signs Last 24 Hrs  T(C): 36.2 (11-09-20 @ 19:00), Max: 36.2 (11-09-20 @ 19:00)  T(F): 97.2 (11-09-20 @ 19:00), Max: 97.2 (11-09-20 @ 19:00)  HR: 81 (11-10-20 @ 08:00) (62 - 81)  BP: 147/76 (11-10-20 @ 08:00) (110/58 - 147/76)  RR: 19 (11-10-20 @ 08:00) (9 - 26)  SpO2: 98% (11-10-20 @ 08:00) (97% - 100%) on room air    Telemetry/Alarms:  sinus rhythm    Relevant labs, radiology and Medications reviewed      MEDICATIONS  (STANDING):  aspirin  chewable 81 milliGRAM(s) Oral daily  colchicine 0.6 milliGRAM(s) Oral two times a day  famotidine    Tablet 20 milliGRAM(s) Oral two times a day  ibuprofen  Tablet. 400 milliGRAM(s) Oral every 8 hours    MEDICATIONS  (PRN):  ondansetron Injectable 4 milliGRAM(s) IV Push every 6 hours PRN Nausea      Physical Exam:  Gen:  NAD, breathing comfortably  Neuro: AO x 3, speech clear  Card:  S1 S2  Pulm:  CTA bilaterally, no accessory muscle use  Abd:  soft NT ND  Ext:  no edema      I&O's Summary      Assessment  80y Female  w/ PAST MEDICAL & SURGICAL HISTORY:  Patient is a 80y old  Female who presents with a chief complaint of chest pain (10 Nov 2020 08:42)    80F with HTN, admitted with chest pain, found to have moderate pericardial effusion with evidence of pericarditis.  Currently being managed conservatively with colchicine and NSAIDS.    PLAN    Continue conservative management.  Repeat TTE.  No acute thoracic surgical intervention planned.    Discussed with Cardiothoracic Team at AM rounds.

## 2020-11-11 ENCOUNTER — TRANSCRIPTION ENCOUNTER (OUTPATIENT)
Age: 80
End: 2020-11-11

## 2020-11-11 VITALS
SYSTOLIC BLOOD PRESSURE: 125 MMHG | RESPIRATION RATE: 22 BRPM | OXYGEN SATURATION: 98 % | HEART RATE: 60 BPM | DIASTOLIC BLOOD PRESSURE: 56 MMHG

## 2020-11-11 PROCEDURE — 99232 SBSQ HOSP IP/OBS MODERATE 35: CPT

## 2020-11-11 PROCEDURE — 99233 SBSQ HOSP IP/OBS HIGH 50: CPT

## 2020-11-11 PROCEDURE — 99239 HOSP IP/OBS DSCHRG MGMT >30: CPT

## 2020-11-11 RX ORDER — INDOMETHACIN 50 MG
1 CAPSULE ORAL
Qty: 21 | Refills: 0
Start: 2020-11-11 | End: 2020-11-17

## 2020-11-11 RX ADMIN — FAMOTIDINE 20 MILLIGRAM(S): 10 INJECTION INTRAVENOUS at 10:34

## 2020-11-11 RX ADMIN — FAMOTIDINE 20 MILLIGRAM(S): 10 INJECTION INTRAVENOUS at 00:13

## 2020-11-11 RX ADMIN — Medication 25 MILLIGRAM(S): at 14:01

## 2020-11-11 RX ADMIN — Medication 81 MILLIGRAM(S): at 10:34

## 2020-11-11 RX ADMIN — Medication 25 MILLIGRAM(S): at 14:48

## 2020-11-11 RX ADMIN — Medication 0.6 MILLIGRAM(S): at 10:34

## 2020-11-11 RX ADMIN — Medication 25 MILLIGRAM(S): at 07:07

## 2020-11-11 RX ADMIN — Medication 25 MILLIGRAM(S): at 00:13

## 2020-11-11 RX ADMIN — Medication 0.6 MILLIGRAM(S): at 00:13

## 2020-11-11 NOTE — PROGRESS NOTE ADULT - SUBJECTIVE AND OBJECTIVE BOX
79 y/o female with h/o HTN presents to the [facility] c/o chest pain. Pt states that shes been experiencing burning/chest tightness across her chest with radiation to upper neck and upper back since yesterday into today, worse with taking a deep breath  associated with sob, nausea.     INTERVAL HPI: no complaints, no dyspnea, no CP, no nausea  Other ROS reviewed and neg     Vital Signs Last 24 Hrs  T(C): 35.8 (11 Nov 2020 05:30), Max: 36.8 (10 Nov 2020 16:40)  T(F): 96.4 (11 Nov 2020 05:30), Max: 98.2 (10 Nov 2020 16:40)  HR: 64 (11 Nov 2020 12:00) (53 - 77)  BP: 116/63 (11 Nov 2020 11:00) (116/63 - 157/72)  BP(mean): 76 (11 Nov 2020 11:00) (75 - 91)  RR: 15 (11 Nov 2020 11:00) (12 - 26)  SpO2: 99% (11 Nov 2020 08:00) (99% - 99%)    MEDICATIONS  (STANDING):  aspirin  chewable 81 milliGRAM(s) Oral daily  colchicine 0.6 milliGRAM(s) Oral two times a day  famotidine    Tablet 20 milliGRAM(s) Oral two times a day  ibuprofen  Tablet. 400 milliGRAM(s) Oral every 8 hours    MEDICATIONS  (PRN):  ondansetron Injectable 4 milliGRAM(s) IV Push every 6 hours PRN Nausea    Transthoracic Echocardiogram Follow Up (11.09.20 @ 10:10)     Summary     Limited study to asses pericardial effusion.   Estimated left ventricular ejection fraction is >70 %.   Normal appearing right ventricle structure and function.   Mitral inflow variation is within normal limits.   A moderate pericardial effusion is present.   Pleural effusion - is present..   The IVC is at the upper limits of normal with decreased respiratory   variation.      PHYSICAL EXAM:    General: female in no acute distress  Eyes: PERRLA, EOMI; conjunctiva and sclera clear  Head: Normocephalic; atraumatic  ENMT: No nasal discharge; airway clear  Neck: Supple; non tender; no masses  Respiratory: No wheezes, rales or rhonchi  Cardiovascular: Regular rate and rhythm. S1 and S2   Gastrointestinal: Soft non-tender non-distended; Normal bowel sounds  Genitourinary: No costovertebral angle tenderness  Extremities:  No clubbing, cyanosis or edema  Vascular: Peripheral pulses palpable 2+ bilaterally  Neurological: Alert and oriented x4  Skin: Warm and dry.   Musculoskeletal: Normal gait, tone, without deformities  Psychiatric: Cooperative and appropriate

## 2020-11-11 NOTE — PROGRESS NOTE ADULT - SUBJECTIVE AND OBJECTIVE BOX
PCP:    REQUESTING PHYSICIAN:    REASON FOR CONSULT:    CHIEF COMPLAINT:    HPI:  80 year old female with PMHX : HTN.  Presents with chest pain.  She describes it as burning/chest tightness across her chest with radiation to upper neck and upper back since yesterday waxing and waining.  Some improvement in pain with sitting more upright; + associated sob; +nausea -vomiting -diaphoresis.  Pain started yesterday and she slept through night and then when she awaoke it was still present this AM and came into ED.  No prior chest pain or cardiac history.      20 Pt feels improved. Cath results reviewed, Echo reviewed.   2020: Picture completely c/w pericarditis and is much better with therapy. CP resolved and no SOB, BP stable.  11/10/20 Patient is feeling ok , stable B_P and heart rate , denies any shortness of breath   20 Pt feels ok. No chest pain or shortness of breath.         PAST MEDICAL & SURGICAL HISTORY:      SOCIAL HISTORY:    FAMILY HISTORY:      ALLERGIES:  Allergies    No Known Allergies    Intolerances        MEDICATIONS:    MEDICATIONS  (STANDING):  aspirin  chewable 81 milliGRAM(s) Oral daily  colchicine 0.6 milliGRAM(s) Oral two times a day  famotidine    Tablet 20 milliGRAM(s) Oral two times a day  indomethacin 25 milliGRAM(s) Oral every 8 hours    MEDICATIONS  (PRN):  ondansetron Injectable 4 milliGRAM(s) IV Push every 6 hours PRN Nausea        Vital Signs Last 24 Hrs  T(C): 35.8 (2020 05:30), Max: 36.8 (10 Nov 2020 16:40)  T(F): 96.4 (2020 05:30), Max: 98.2 (10 Nov 2020 16:40)  HR: 60 (2020 14:00) (53 - 77)  BP: 125/56 (2020 14:00) (116/63 - 157/72)  BP(mean): 74 (2020 14:00) (74 - 91)  RR: 22 (2020 14:00) (12 - 26)  SpO2: 98% (2020 14:00) (98% - 99%)Daily     Daily Weight in k.4 (2020 05:30)I&O's Summary    10 Nov 2020 07:01  -  2020 07:00  --------------------------------------------------------  IN: 420 mL / OUT: 0 mL / NET: 420 mL        PHYSICAL EXAM:    Constitutional: NAD, awake and alert, well-developed  HEENT: PERR, EOMI,  No oral cyananosis.  Neck:  supple,  No JVD  Respiratory: Breath sounds are clear bilaterally, No wheezing, rales or rhonchi  Cardiovascular: S1 and S2, regular rate and rhythm, no Murmurs, gallops or rubs  Gastrointestinal: Bowel Sounds present, soft, nontender.   Extremities: No peripheral edema. No clubbing or cyanosis.  Vascular: 2+ peripheral pulses  Neurological: A/O x 3, no focal deficits  Musculoskeletal: no calf tenderness.  Skin: No rashes.      LABS: All Labs Reviewed:                Blood Culture: Organism --  Gram Stain Blood -- Gram Stain --  Specimen Source .Urine Clean Catch (Midstream)  Culture-Blood --            RADIOLOGY/EKG:      ECHO/CARDIAC CATHTERIZATION/STRESS TEST:< from: Transthoracic Echocardiogram Follow Up (20 @ 10:10) >   Impression     Summary     Limited study to asses pericardial effusion.   Estimated left ventricular ejection fraction is >70 %.   Normal appearing right ventricle structure and function.   Mitral inflow variation is within normal limits.   A moderate pericardial effusion is present.   Pleural effusion - is present..   The IVC is at the upper limits of normal with decreased respiratory   variation.     Signature     ----------------------------------------------------------------   Electronically signed by Fabian Syed MD(Interpreting   physician) on 2020 05:27 PM   ----------------------------------------------------------------      < end of copied text >

## 2020-11-11 NOTE — PROGRESS NOTE ADULT - SUBJECTIVE AND OBJECTIVE BOX
Subjective:  Patient has no symptoms, denies shortness of breath or chest pain.    Vital Signs:  Vital Signs Last 24 Hrs  T(C): 35.8 (11-11-20 @ 05:30), Max: 36.8 (11-10-20 @ 16:40)  T(F): 96.4 (11-11-20 @ 05:30), Max: 98.2 (11-10-20 @ 16:40)  HR: 53 (11-11-20 @ 08:00) (53 - 79)  BP: 133/60 (11-11-20 @ 08:00) (95/80 - 157/72)  RR: 19 (11-11-20 @ 08:00) (12 - 26)  SpO2: 99% (11-11-20 @ 08:00) (99% - 99%) on RA    Telemetry/Alarms:  sinus rhythm    Relevant labs, radiology and Medications reviewed      MEDICATIONS  (STANDING):  aspirin  chewable 81 milliGRAM(s) Oral daily  colchicine 0.6 milliGRAM(s) Oral two times a day  famotidine    Tablet 20 milliGRAM(s) Oral two times a day  indomethacin 25 milliGRAM(s) Oral every 8 hours    MEDICATIONS  (PRN):  ondansetron Injectable 4 milliGRAM(s) IV Push every 6 hours PRN Nausea      Physical Exam:  Gen:  NAD, breathing comfortably  Neuro: AO x 3, speech clear  Card:  S1 S2  Pulm:  CTA bilaterally, no accessory muscle use  Abd:  soft NT ND  Ext:  no edema    I&O's Summary    10 Nov 2020 07:01  -  11 Nov 2020 07:00  --------------------------------------------------------  IN: 420 mL / OUT: 0 mL / NET: 420 mL        Assessment  80y Female  w/ PAST MEDICAL & SURGICAL HISTORY:  Patient is a 80y old  Female who presents with a chief complaint of chest pain (10 Nov 2020 12:08)    80F with HTN, admitted with chest pain, found to have moderate pericardial effusion with evidence of pericarditis.  Currently being managed conservatively with colchicine and NSAIDS.    PLAN    Repeat TTE.  No acute thoracic surgical intervention planned.  D/C planning.    Discussed with Cardiothoracic Team at AM rounds.

## 2020-11-11 NOTE — PROGRESS NOTE ADULT - ASSESSMENT
81 y/o female with h/o HTN presents to the ED c/o chest pain    1. CP with neg cardiac cath - due to acute pericarditis   - cont NSAIDs, colchicine   - repeat TTE as per cardiology - stable fluid - discussed with CTS - pericardiocentesis will be indicated if tamponade physiology present or if pt becomes symptomatic   - as discussed with Dr. Syed pt is medically stable for DC home with f/u in 1 week

## 2020-11-11 NOTE — DISCHARGE NOTE NURSING/CASE MANAGEMENT/SOCIAL WORK - PATIENT PORTAL LINK FT
You can access the FollowMyHealth Patient Portal offered by [facility] by registering at the following website: [email or web address]. By joining [facility]’s FollowMyHealth portal, you will also be able to view your health information using other applications (apps) compatible with our system. DISPLAY PLAN FREE TEXT

## 2020-11-11 NOTE — PROGRESS NOTE ADULT - REASON FOR ADMISSION
chest pain

## 2020-11-11 NOTE — PROGRESS NOTE ADULT - PROBLEM SELECTOR PLAN 3
Moderate in size without obvious evidence of tamponade hemodynamically and echocardiogram to monitor size. Will follow up as an opt.     will repeat echo  , continue NSAID  Colchicine

## 2020-11-12 LAB — ANA TITR SER: NEGATIVE — SIGNIFICANT CHANGE UP

## 2020-11-16 DIAGNOSIS — I30.9 ACUTE PERICARDITIS, UNSPECIFIED: ICD-10-CM

## 2020-11-16 DIAGNOSIS — I10 ESSENTIAL (PRIMARY) HYPERTENSION: ICD-10-CM

## 2020-11-16 DIAGNOSIS — I31.3 PERICARDIAL EFFUSION (NONINFLAMMATORY): ICD-10-CM

## 2020-11-20 ENCOUNTER — APPOINTMENT (OUTPATIENT)
Dept: CARDIOLOGY | Facility: CLINIC | Age: 80
End: 2020-11-20
Payer: COMMERCIAL

## 2020-11-20 ENCOUNTER — APPOINTMENT (OUTPATIENT)
Dept: INTERNAL MEDICINE | Facility: CLINIC | Age: 80
End: 2020-11-20

## 2020-11-20 ENCOUNTER — NON-APPOINTMENT (OUTPATIENT)
Age: 80
End: 2020-11-20

## 2020-11-20 VITALS
OXYGEN SATURATION: 99 % | SYSTOLIC BLOOD PRESSURE: 150 MMHG | TEMPERATURE: 97.8 F | WEIGHT: 133 LBS | HEART RATE: 73 BPM | BODY MASS INDEX: 26.11 KG/M2 | HEIGHT: 60 IN | DIASTOLIC BLOOD PRESSURE: 76 MMHG

## 2020-11-20 DIAGNOSIS — Z00.00 ENCOUNTER FOR GENERAL ADULT MEDICAL EXAMINATION W/OUT ABNORMAL FINDINGS: ICD-10-CM

## 2020-11-20 DIAGNOSIS — I31.3 PERICARDIAL EFFUSION (NONINFLAMMATORY): ICD-10-CM

## 2020-11-20 PROCEDURE — 93000 ELECTROCARDIOGRAM COMPLETE: CPT

## 2020-11-20 PROCEDURE — 99214 OFFICE O/P EST MOD 30 MIN: CPT

## 2020-11-20 RX ORDER — AMLODIPINE BESYLATE 10 MG/1
10 TABLET ORAL DAILY
Refills: 0 | Status: ACTIVE | COMMUNITY

## 2020-11-20 RX ORDER — ASPIRIN 81 MG/1
81 TABLET ORAL DAILY
Refills: 0 | Status: ACTIVE | COMMUNITY

## 2020-11-20 NOTE — HISTORY OF PRESENT ILLNESS
[FreeTextEntry1] : 81 yo female presents after  stay. Pt had seen her PMD on Tuesday who discontinued colchicine and began  prednisone. Pt feels well otherwise and since colchicine was discontinued  her diarrhea has discontinued. . Pt denies chest pain or shortness of breath.

## 2020-11-20 NOTE — PHYSICAL EXAM
[General Appearance - Well Developed] : well developed [Normal Appearance] : normal appearance [Well Groomed] : well groomed [General Appearance - Well Nourished] : well nourished [No Deformities] : no deformities [General Appearance - In No Acute Distress] : no acute distress [Normal Conjunctiva] : the conjunctiva exhibited no abnormalities [Eyelids - No Xanthelasma] : the eyelids demonstrated no xanthelasmas [Normal Oral Mucosa] : normal oral mucosa [No Oral Pallor] : no oral pallor [No Oral Cyanosis] : no oral cyanosis [Normal Jugular Venous A Waves Present] : normal jugular venous A waves present [Normal Jugular Venous V Waves Present] : normal jugular venous V waves present [No Jugular Venous Ge A Waves] : no jugular venous ge A waves [Respiration, Rhythm And Depth] : normal respiratory rhythm and effort [Exaggerated Use Of Accessory Muscles For Inspiration] : no accessory muscle use [Auscultation Breath Sounds / Voice Sounds] : lungs were clear to auscultation bilaterally [Heart Rate And Rhythm] : heart rate and rhythm were normal [Heart Sounds] : normal S1 and S2 [Murmurs] : no murmurs present [Abdomen Soft] : soft [Abdomen Tenderness] : non-tender [Abdomen Mass (___ Cm)] : no abdominal mass palpated [Abnormal Walk] : normal gait [Gait - Sufficient For Exercise Testing] : the gait was sufficient for exercise testing [Nail Clubbing] : no clubbing of the fingernails [Cyanosis, Localized] : no localized cyanosis [Petechial Hemorrhages (___cm)] : no petechial hemorrhages [Skin Color & Pigmentation] : normal skin color and pigmentation [] : no rash [No Venous Stasis] : no venous stasis [Skin Lesions] : no skin lesions [No Skin Ulcers] : no skin ulcer [No Xanthoma] : no  xanthoma was observed [Oriented To Time, Place, And Person] : oriented to person, place, and time [Affect] : the affect was normal [Mood] : the mood was normal [No Anxiety] : not feeling anxious

## 2020-11-20 NOTE — DISCUSSION/SUMMARY
[Patient] : the patient [FreeTextEntry4] : pericarditis [FreeTextEntry1] : Pt will have a repert Echo, she will stop the prednisone, and stop the colchicine. Labs will be obtained from her PMD. Rheum eval suggested. Heme evaluation is planned.

## 2020-12-07 ENCOUNTER — APPOINTMENT (OUTPATIENT)
Dept: CARDIOLOGY | Facility: CLINIC | Age: 80
End: 2020-12-07

## 2020-12-10 ENCOUNTER — APPOINTMENT (OUTPATIENT)
Dept: RHEUMATOLOGY | Facility: CLINIC | Age: 80
End: 2020-12-10